# Patient Record
Sex: FEMALE | Race: WHITE | NOT HISPANIC OR LATINO | Employment: FULL TIME | URBAN - NONMETROPOLITAN AREA
[De-identification: names, ages, dates, MRNs, and addresses within clinical notes are randomized per-mention and may not be internally consistent; named-entity substitution may affect disease eponyms.]

---

## 2023-03-12 ENCOUNTER — OFFICE VISIT (OUTPATIENT)
Dept: URGENT CARE | Facility: PHYSICIAN GROUP | Age: 53
End: 2023-03-12
Payer: OTHER MISCELLANEOUS

## 2023-03-12 VITALS
BODY MASS INDEX: 27.66 KG/M2 | HEIGHT: 64 IN | SYSTOLIC BLOOD PRESSURE: 136 MMHG | TEMPERATURE: 97 F | DIASTOLIC BLOOD PRESSURE: 80 MMHG | RESPIRATION RATE: 18 BRPM | HEART RATE: 78 BPM | WEIGHT: 162 LBS | OXYGEN SATURATION: 97 %

## 2023-03-12 DIAGNOSIS — R11.0 NAUSEA: ICD-10-CM

## 2023-03-12 DIAGNOSIS — B96.89 ACUTE BACTERIAL SINUSITIS: ICD-10-CM

## 2023-03-12 DIAGNOSIS — J01.90 ACUTE BACTERIAL SINUSITIS: ICD-10-CM

## 2023-03-12 DIAGNOSIS — J02.9 ACUTE PHARYNGITIS, UNSPECIFIED ETIOLOGY: ICD-10-CM

## 2023-03-12 LAB — S PYO DNA SPEC NAA+PROBE: NOT DETECTED

## 2023-03-12 PROCEDURE — 99203 OFFICE O/P NEW LOW 30 MIN: CPT | Performed by: FAMILY MEDICINE

## 2023-03-12 PROCEDURE — 87651 STREP A DNA AMP PROBE: CPT | Performed by: FAMILY MEDICINE

## 2023-03-12 RX ORDER — AZITHROMYCIN 250 MG/1
TABLET, FILM COATED ORAL
Qty: 6 TABLET | Refills: 0 | Status: SHIPPED | OUTPATIENT
Start: 2023-03-12 | End: 2023-03-17

## 2023-03-12 RX ORDER — ONDANSETRON 4 MG/1
TABLET, ORALLY DISINTEGRATING ORAL
Qty: 15 TABLET | Refills: 0 | Status: SHIPPED | OUTPATIENT
Start: 2023-03-12

## 2023-03-12 ASSESSMENT — FIBROSIS 4 INDEX: FIB4 SCORE: 1.33

## 2023-03-12 NOTE — LETTER
March 12, 2023         Patient: Mirna Demarco   YOB: 1970   Date of Visit: 3/12/2023           To Whom it May Concern:    Mirna Demarco was seen in my clinic on 3/12/2023.     Please excuse from work for due to medical condition.     If you have any questions or concerns, please don't hesitate to call.        Sincerely,           Jose Roberto Guerrero M.D.  Electronically Signed

## 2023-03-12 NOTE — LETTER
March 12, 2023         Patient: Mirna Demarco   YOB: 1970   Date of Visit: 3/12/2023           To Whom it May Concern:    Mirna Demarco was seen in my clinic on 3/12/2023.     Please excuse from work for 3/14 thru and including 3/16/23 due to medical condition.     If you have any questions or concerns, please don't hesitate to call.        Sincerely,           Jose Roberto Guerrero M.D.  Electronically Signed

## 2023-03-12 NOTE — PROGRESS NOTES
Chief Complaint:    Chief Complaint   Patient presents with    Congestion     Yellow mucous production. X 3 days      Pharyngitis    Headache       History of Present Illness:    Symptoms x 2 days; has fatigue, nasal congestion with purulent mucus from nose, post-nasal drainage, sore throat, non-productive cough, and nausea. No fever. Negative home Covid test last night. Would like Zofran Rx.      Past Medical History:    No past medical history on file.    Past Surgical History:    No past surgical history on file.    Social History:    Social History     Socioeconomic History    Marital status:      Spouse name: Not on file    Number of children: Not on file    Years of education: Not on file    Highest education level: Not on file   Occupational History    Not on file   Tobacco Use    Smoking status: Not on file    Smokeless tobacco: Not on file   Substance and Sexual Activity    Alcohol use: Not on file    Drug use: Not on file    Sexual activity: Not on file   Other Topics Concern    Not on file   Social History Narrative    Not on file     Social Determinants of Health     Financial Resource Strain: Not on file   Food Insecurity: Not on file   Transportation Needs: Not on file   Physical Activity: Not on file   Stress: Not on file   Social Connections: Not on file   Intimate Partner Violence: Not on file   Housing Stability: Not on file     Family History:    No family history on file.    Medications:    No current outpatient medications on file prior to visit.     No current facility-administered medications on file prior to visit.     Allergies:    Allergies   Allergen Reactions    Latex Unspecified and Rash     Patient states having allergy to Latex.      Penicillins Unspecified     Patient states having allergy to Amoxicillin.   Pt states her mother reported this allergy to her; unknown reaction, ( pt's mother now  )      Codeine Nausea and Vomiting       Vitals:    Vitals:    23 1352  "  BP: 136/80   Pulse: 78   Resp: 18   Temp: 36.1 °C (97 °F)   TempSrc: Temporal   SpO2: 97%   Weight: 73.5 kg (162 lb)   Height: 1.626 m (5' 4\")       Physical Exam:    Constitutional: Vital signs reviewed. Appears well-developed and well-nourished. No acute distress.   Eyes: Sclera white, conjunctivae clear.   ENT: External ears normal. External auditory canals normal without discharge. TMs translucent and non-bulging. Hearing normal. Lips/teeth are normal. Oral mucosa pink and moist. Posterior pharynx: mildly erythematous.  Neck: Neck supple.   Cardiovascular: Regular rate and rhythm. No murmur.  Pulmonary/Chest: Respirations non-labored. Clear to auscultation bilaterally.  Musculoskeletal: Normal gait. No muscular atrophy or weakness.  Neurological: Alert and oriented to person, place, and time. Muscle tone normal. Coordination normal.   Skin: No rashes or lesions. Warm, dry, normal turgor.  Psychiatric: Normal mood and affect. Behavior is normal. Judgment and thought content normal.       Diagnostics:    Cepheid PCR test for Strep A is negative.       Medical Decision Makin. Acute bacterial sinusitis  - azithromycin (ZITHROMAX) 250 MG Tab; 2 TABS BY MOUTH ON DAY 1, 1 TAB ON DAYS 2-5.  Dispense: 6 Tablet; Refill: 0    2. Acute pharyngitis, unspecified etiology  - POCT CEPHEID GROUP A STREP - PCR    3. Nausea  - ondansetron (ZOFRAN ODT) 4 MG TABLET DISPERSIBLE; 1 TAB, ALLOW TO DISINTEGRATE IN MOUTH, EVERY 6 HOURS ONLY IF NEEDED FOR NAUSEA OR VOMITING.  Dispense: 15 Tablet; Refill: 0       Work note given - excuse for 3/14 thru and including 3/16/23.    Discussed with her DDX, management options, and risks, benefits, and alternatives to treatment plan agreed upon.    Symptoms x 2 days; has fatigue, nasal congestion with purulent mucus from nose, post-nasal drainage, sore throat, non-productive cough, and nausea. No fever. Negative home Covid test last night. Would like Zofran Rx.    Posterior pharynx: mildly " erythematous.    Cepheid PCR test for Strep A is negative.     May use over-the-counter meds for symptoms as needed.     Agreeable to medications prescribed.    Discussed expected course of duration, time for improvement, and to seek follow-up in Emergency Room, urgent care, or with PCP if getting worse at any time or not improving within expected time frame.